# Patient Record
Sex: MALE | Race: WHITE
[De-identification: names, ages, dates, MRNs, and addresses within clinical notes are randomized per-mention and may not be internally consistent; named-entity substitution may affect disease eponyms.]

---

## 2018-03-26 ENCOUNTER — HOSPITAL ENCOUNTER (OUTPATIENT)
Dept: HOSPITAL 33 - SDC | Age: 53
Discharge: HOME | End: 2018-03-26
Attending: SURGERY
Payer: COMMERCIAL

## 2018-03-26 VITALS — DIASTOLIC BLOOD PRESSURE: 82 MMHG | HEART RATE: 57 BPM | SYSTOLIC BLOOD PRESSURE: 120 MMHG

## 2018-03-26 VITALS — OXYGEN SATURATION: 95 %

## 2018-03-26 DIAGNOSIS — M19.90: ICD-10-CM

## 2018-03-26 DIAGNOSIS — Z79.899: ICD-10-CM

## 2018-03-26 DIAGNOSIS — R13.10: ICD-10-CM

## 2018-03-26 DIAGNOSIS — K31.7: ICD-10-CM

## 2018-03-26 DIAGNOSIS — K29.70: Primary | ICD-10-CM

## 2018-03-26 DIAGNOSIS — K22.2: ICD-10-CM

## 2018-03-26 DIAGNOSIS — K21.9: ICD-10-CM

## 2018-03-26 DIAGNOSIS — K22.4: ICD-10-CM

## 2018-03-26 PROCEDURE — 0DB68ZX EXCISION OF STOMACH, VIA NATURAL OR ARTIFICIAL OPENING ENDOSCOPIC, DIAGNOSTIC: ICD-10-PCS | Performed by: SURGERY

## 2018-03-26 PROCEDURE — 0D718ZZ DILATION OF UPPER ESOPHAGUS, VIA NATURAL OR ARTIFICIAL OPENING ENDOSCOPIC: ICD-10-PCS | Performed by: SURGERY

## 2018-03-26 PROCEDURE — 0DB78ZX EXCISION OF STOMACH, PYLORUS, VIA NATURAL OR ARTIFICIAL OPENING ENDOSCOPIC, DIAGNOSTIC: ICD-10-PCS | Performed by: SURGERY

## 2018-03-26 NOTE — HP
DATE OF SURGERY:  03/26/2018



HISTORY OF PRESENT ILLNESS:  The patient is a 52 year-old for the past one and a half 
months has had problems swallowing no matter what type of food, wakes him up at night with 
some reflux, gets stuck in the upper esophagus area at times. No prior endoscopy. 



PAST MEDICAL HISTORY:  Reflux, arthritis. 



MEDICATIONS:  Acyclovir, rabeprazole, diclofenac. 



ALLERGIES:  NKDA.



PAST SURGICAL HISTORY:  He denied any prior abdominal surgery. 



FAMILY HISTORY:  Negative for esophageal or stomach cancer in the past. 



SOCIAL HISTORY:  No smoking or alcohol abuse. 



REVIEW OF SYSTEMS:  Twelve systems reviewed per admission assessment. He denies any chest 
pain or palpitation. He denies any chronic heart disease or chronic lung disease. Other 
systems negative or noncontributory as above and per preadmission questionnaire. 



PHYSICAL EXAMINATION:  

GENERAL:  No acute distress.

HEENT: Sclerae nonicteric.

NECK:  No JVD.

CHEST: Equal excursion, nonlabored breathing. 

CVS:  Regular rate and rhythm. 

ABDOMEN:  Soft. No peritoneal signs.   

EXTREMITIES:  No significant edema.

NEURO:  Alert, moving extremities symmetrically. No gross motor deficits noted.

 

IMPRESSION:  Dysphagia, some reflux unclear etiology. I feel he will benefit from upper 
endoscopy possible biopsy possible dilatation depending on operative findings. Risks and 
benefits explained in detail. He was shown the risk sheet and explained the procedure in 
detail but not limited to bleeding or infection, small risk of bowel injury or perforation 
possibly requiring open procedure, risk of ongoing morbidity, possible need for transfer, 
general risk of aches, pains or bloating, risk of missed or nondiagnosis or incomplete 
exam possibly requiring other studies or referrals, general risk of anesthesia or 
sedation, possibility should dilatation be performed he might need this again in the 
future. He also understands that if this is more of a neurological or functional problem 
other than mechanical narrowed area that dilatation may not improve the situation. He may 
need further work up or other medical treatment. He understands and agrees to the planned 
procedure and will proceed with EGD possible biopsy possible dilatation as an outpatient.

## 2018-03-27 NOTE — OP
SURGERY DATE/TIME:    03/26/2018  1120



PREOPERATIVE DIAGNOSIS:    Dysphagia.  



POSTOPERATIVE DIAGNOSES: 

1) Gastritis. 

2) Multiple gastric polyps (question fundal gland polyps). 

3) Symptomatic proximal esophageal narrowing and spasm without evidence of any mass.  



PROCEDURES:     

1) EGD with cold biopsy of the antrum to evaluate for Helicobacter pylori. 

2) Hot snare polypectomy of four to five gastric polyps. 

3) Proximal esophageal balloon dilatation. 

4) Proximal esophageal narrowing (size 20 balloon dilator).



SURGEON:        Dr. Wilfredo Huizar.



ANESTHESIA:    MAC. 



ESTIMATED BLOOD LOSS:    Minimal.    



INDICATIONS:  As noted above. Risks and benefits explained in detail and not limited to 
and consent obtained.     

     

DESCRIPTION OF PROCEDURE AND FINDINGS: The patient is taken to the operating room. MAC 
anesthesia introduced. After official time out and no disagreement with planned procedure, 
a bite block positioned. Video gastroscope passed into oropharynx through the narrowed 
area proximal esophagus. No obvious mass. The scope was able to be passed back through 
there through the gastroesophageal junction at 40 cm to the patent pylorus to the junction 
of the second and third portion of the duodenum. On withdrawal of the scope duodenum and 
duodenal bulb grossly unremarkable. Back in the stomach he had multiple polyps. These 
looked more benign fundal gland polyps but some of them were larger so four or five of 
these were removed with hot snare polypectomy with brief bursts of cautery. They were too 
thick to suction through the scope but the biopsies were taken after they were snared off 
and passed off for pathology. On retroflex there were there was no evidence of any 
significant hiatal hernia. The scope straightened. Gastroesophageal junction 40 cm. Z-line 
was fairly crisp. No signs of any obvious esophagitis distal esophagus. The remainder of 
the esophagus grossly unremarkable up to the narrowed area proximally where he was having 
symptoms and dysphagia. It was felt he would benefit from dilatation in that area. The 
scope is passed back down in the stomach again cold biopsy had been taken in the antrum 
for Helicobacter pylori. Hot snare polypectomy of the gastric polyps had been 
accomplished. Balloon catheter inserted and pulled back up to the proximal esophageal 
narrowing and then gradually inflated to first stage for 40 seconds, the second stage for 
45 seconds, final stage for 2 minutes. The balloon catheter is then decompressed and 
removed. The catheter much more easily passed through this area down in the stomach and 
gradually withdrawn. There were no signs of any full thickness issues or injuries 
secondary to. There were no signs of any full thickness issue or injury secondary to 
balloon dilatation. The patient tolerated the procedure well. Findings discussed with the 
family out in the waiting area.